# Patient Record
Sex: FEMALE | Employment: UNEMPLOYED | URBAN - METROPOLITAN AREA
[De-identification: names, ages, dates, MRNs, and addresses within clinical notes are randomized per-mention and may not be internally consistent; named-entity substitution may affect disease eponyms.]

---
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
## 2023-01-14 ENCOUNTER — HEALTH MAINTENANCE LETTER (OUTPATIENT)
Age: 18
End: 2023-01-14

## 2023-04-23 ENCOUNTER — HEALTH MAINTENANCE LETTER (OUTPATIENT)
Age: 18
End: 2023-04-23

## 2023-09-29 NOTE — PROGRESS NOTES
GYNECOLOGIC EXAM  29 year old     CHIEF COMPLAINT: Very concerned about weight.  Wants to have a mommy makeover and currently does not meet criteria.      ALLERGIES:   Allergen Reactions   • Bactrim Ds RASH     Developed face/neck rash 3 days into taking antibiotic, but developed viral illness about 1 week later. Questionable drug vs. Viral rash.   • Citalopram ANXIETY     Couldn't sleep     Current Outpatient Medications   Medication Sig   • buPROPion XL (WELLBUTRIN XL) 150 MG 24 hr tablet Take 1 tablet by mouth daily.   • metFORMIN (GLUCOPHAGE) 500 MG tablet Take 1 tablet by mouth in the morning and 1 tablet in the evening. Take with meals.   • phentermine (ADIPEX-P) 37.5 MG tablet Take 1 tablet by mouth daily (before breakfast).   • ketoconazole (NIZORAL) 2 % shampoo Apply topically daily.   • ketoconazole (NIZORAL) 2 % cream Apply topically daily.   • albuterol (Proventil HFA) 108 (90 Base) MCG/ACT inhaler Inhale 2 puffs into the lungs every 4 hours as needed for Shortness of Breath or Wheezing.     Current Facility-Administered Medications   Medication   • medroxyPROGESTERone (DEPO-PROVERA) 150 MG/ML injection 150 mg     Past Medical History:   Diagnosis Date   • Anemia      Past Surgical History:   Procedure Laterality Date   • Pap smear,routine  09/07/2011     Social History     Socioeconomic History   • Marital status: Single     Spouse name: Not on file   • Number of children: Not on file   • Years of education: Not on file   • Highest education level: Not on file   Occupational History   • Not on file   Tobacco Use   • Smoking status: Some Days     Types: Cigarettes   • Smokeless tobacco: Never   Vaping Use   • Vaping Use: never used   Substance and Sexual Activity   • Alcohol use: Yes     Comment: Rare since delivery   • Drug use: No   • Sexual activity: Yes     Partners: Male     Comment: attempted intercourse, but smelled really bad   Other Topics Concern   • Not on file   Social History Narrative   •  Tracy Hall is a 16 year old who has consented to receive services via billable video visit.      Pt will join video visit via: Maximiliano  If there are problems joining the visit, send backup video invite via: NA      Originating Location (patient location): Patient's home  Distant Location (provider location): [facility]    Will anyone else be joining the video visit? Yes, mom    How would you prefer to obtain AVS?: [employer]  Answers for HPI/ROS submitted by the patient on 3/23/2022  If you checked off any problems, how difficult have these problems made it for you to do your work, take care of things at home, or get along with other people?: Somewhat difficult  PHQ9 TOTAL SCORE: 20       Not on file     Social Determinants of Health     Financial Resource Strain: Low Risk  (2023)    Financial Resource Strain    • Social Determinants: Financial Resource Strain: None   Food Insecurity: No Food Insecurity (2023)    Food Insecurity    • Social Determinants: Food Insecurity: Never   Transportation Needs: No Transportation Needs (2023)    PRAPARE - Transportation    • Lack of Transportation (Medical): No    • Lack of Transportation (Non-Medical): No   Physical Activity: Not on file   Stress: Not on file   Social Connections: Socially Integrated (2023)    Social Connections    • Social Determinants: Social Connections: 5 or more times a week   Intimate Partner Violence: Not At Risk (2023)    Intimate Partner Violence    • Social Determinants: Intimate Partner Violence Past Fear: No    • Social Determinants: Intimate Partner Violence Current Fear: No     Family History   Problem Relation Age of Onset   • Diabetes Mother         pre diabetes   • Heart disease Mother         Heart murmur and surgery   • Other Mother         ovarian torsion and burst   • Hypertension Mother    • Patient is unaware of any medical problems Father    • Depression Sister         Self harm   • Depression Brother         Self harm   • Depression Brother         Self harm   • ADHD/ADD Son    • Diabetes Maternal Aunt    • Diabetes Maternal Uncle    • Diabetes Paternal Aunt    • Diabetes Paternal Uncle    • Heart disease Maternal Grandfather    • Diabetes Maternal Grandfather    • Hypertension Maternal Grandfather    • Heart disease Paternal Grandmother    • Diabetes Paternal Grandmother    • Hypertension Paternal Grandmother    • Heart disease Paternal Grandfather      OB History    Para Term  AB Living   4 3 3 0 1 3   SAB IAB Ectopic Molar Multiple Live Births   0 1 0 0 0 3            GYNECOLOGIC HISTORY:  Patient's last menstrual period was 06/15/2023 (exact date). On depo.    REVIEW OF  SYSTEMS:  Constitutional: Denies headache. No weight changes. No fevers or chills. No fatigue.  HEENT: Denies vision changes or hearing changes. No sinus problems.   Breasts: Denies breast masses, pain or nipple discharge.  Respiratory: No cough or shortness of breath.  Cardiovascular: Denies chest pain, syncope or palpitations.  Gastrointestinal: Denies nausea, vomiting, diarrhea, or constipation.  Gynecologic: Denies pelvic pain, vaginal discharge, itching, or odor.   Urinary: Denies urgency, frequency, dysuria, or incontinence.  Endocrine: Denies hot flashes, night sweats, heat or cold intolerance.  Hematologic: Denies easy bruising or bleeding disorders.  Allergies/Immunologic: Denies seasonal allergies or any history of immunologic disorders.   Musculoskeletal: Denies joint pain, swelling, or erythema.  Skin: Denies rashes, significant lesions or pruritus.  Psychiatric: Denies anxiety, depression, memory deficits, and appetite or sleep changes.    Visit Vitals  /80   Wt 89.3 kg (196 lb 12.8 oz)   LMP 06/15/2023 (Exact Date)   BMI 36.00 kg/m²       GENERAL APPEARANCE: The patient is a pleasant, normal appearing female with normal affect and in no distress.  PSYCHIATRIC/NEUROLOGIC: Appropriate mood and affect, normal recall, alert and oriented x 3. Some anxiety about appearance.    ASSESSMENT:  1. Anxiety    2. Obesity (BMI 30-39.9)    3. Depo-Provera contraceptive status        PLAN:  1. Discussed weight issues 10/10 minutes wants to stay on depo, will try adding Wellburion  2. Reevaluate in 1 month

## 2024-04-27 ENCOUNTER — HEALTH MAINTENANCE LETTER (OUTPATIENT)
Age: 19
End: 2024-04-27

## 2025-05-11 ENCOUNTER — HEALTH MAINTENANCE LETTER (OUTPATIENT)
Age: 20
End: 2025-05-11

## (undated) RX ORDER — FENTANYL CITRATE 50 UG/ML
INJECTION, SOLUTION INTRAMUSCULAR; INTRAVENOUS
Status: DISPENSED
Start: 2022-07-05